# Patient Record
Sex: FEMALE | URBAN - METROPOLITAN AREA
[De-identification: names, ages, dates, MRNs, and addresses within clinical notes are randomized per-mention and may not be internally consistent; named-entity substitution may affect disease eponyms.]

---

## 2023-06-29 ENCOUNTER — MEDICAL CORRESPONDENCE (OUTPATIENT)
Dept: HEALTH INFORMATION MANAGEMENT | Facility: CLINIC | Age: 43
End: 2023-06-29
Payer: COMMERCIAL

## 2023-07-07 ENCOUNTER — TRANSCRIBE ORDERS (OUTPATIENT)
Dept: OTHER | Age: 43
End: 2023-07-07

## 2023-07-07 DIAGNOSIS — T83.712D EROSION OF IMPLANTED URETHRAL MESH INTO SURROUNDING TISSUE, SUBSEQUENT ENCOUNTER: Primary | ICD-10-CM

## 2023-08-09 NOTE — TELEPHONE ENCOUNTER
MEDICAL RECORDS REQUEST   [facility]  [facility]rology Clinic  [address] [place] [address]  PHONE: [phone]  Fax: [phone]        FUTURE VISIT INFORMATION                                                   Cheng Montejo, : 1980 scheduled for future visit at [facility]    APPOINTMENT INFORMATION:  Date: 2023  Provider:  Naz Perez MD  Reason for Visit/Diagnosis: Mesh Erosion    REFERRAL INFORMATION:  Referring provider:  Ronak Anand MD @ [facility] [place]      RECORDS REQUESTED FOR VISIT                                                     NOTES  STATUS/DETAILS   OFFICE NOTE from referring provider  yes, 2023, 10/18/2022 -- Ronak Anand MD @ [facility] [place]   OPERATIVE REPORT  yes, 2022 -- [facility]   MEDICATION LIST  yes   LABS     URINALYSIS (UA)  no   images  no     PRE-VISIT CHECKLIST      Joint diagnostic appointment coordinated correctly          (ensure right order & amount of time) Yes   RECORD COLLECTION COMPLETE Yes

## 2023-09-22 ENCOUNTER — PRE VISIT (OUTPATIENT)
Dept: UROLOGY | Facility: CLINIC | Age: 43
End: 2023-09-22

## 2025-06-04 ENCOUNTER — TRANSCRIBE ORDERS (OUTPATIENT)
Dept: OTHER | Age: 45
End: 2025-06-04

## 2025-06-04 DIAGNOSIS — T83.712A: Primary | ICD-10-CM

## 2025-06-04 DIAGNOSIS — N39.3 SUI (STRESS URINARY INCONTINENCE, FEMALE): ICD-10-CM

## 2025-06-05 ENCOUNTER — PATIENT OUTREACH (OUTPATIENT)
Dept: CARE COORDINATION | Facility: CLINIC | Age: 45
End: 2025-06-05
Payer: COMMERCIAL

## 2025-06-09 ENCOUNTER — PATIENT OUTREACH (OUTPATIENT)
Dept: CARE COORDINATION | Facility: CLINIC | Age: 45
End: 2025-06-09
Payer: COMMERCIAL

## 2025-06-17 NOTE — TELEPHONE ENCOUNTER
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    REFERRAL INFORMATION:  Referring provider:  Chapito Sanders MD @ [facility] [place]      RECORDS REQUESTED FOR VISIT                                                     NOTES  STATUS/DETAILS   OFFICE NOTE from referring provider  2025 -- Chapito Sanders MD @ [facility] [place]   OFFICE NOTE from other specialist  in process   MEDICATION LIST  yes   LABS     URINALYSIS (UA)  in process   IMAGES  in process     PRE-VISIT CHECKLIST      Joint diagnostic appointment coordinated correctly          (ensure right order & amount of time) Yes   RECORD COLLECTION COMPLETE If no, please explain PENDING

## 2025-07-29 ENCOUNTER — PRE VISIT (OUTPATIENT)
Dept: UROLOGY | Facility: CLINIC | Age: 45
End: 2025-07-29
Payer: COMMERCIAL

## 2025-07-29 NOTE — TELEPHONE ENCOUNTER
Reason for visit: eroded mesh      Relevant information: Patient had urethral sling 3/16/2021. Patient referred to Dr. Mcdaniel by  Chapito Sanders MD @ [facility] [place] 5/27/2025 for mesh erosion     Records/imaging/labs/orders: All records available     Pt called: no need for a call    At Rooming: Standard.   If time-have patient empty their bladder and PVR.   Get a urine sample and dip the urine if they have UTI symptoms.     Zoe Gómez  7/29/2025  8:58 AM

## 2025-08-13 ENCOUNTER — PRE VISIT (OUTPATIENT)
Dept: UROLOGY | Facility: CLINIC | Age: 45
End: 2025-08-13